# Patient Record
Sex: MALE | Race: WHITE | Employment: OTHER | URBAN - NONMETROPOLITAN AREA
[De-identification: names, ages, dates, MRNs, and addresses within clinical notes are randomized per-mention and may not be internally consistent; named-entity substitution may affect disease eponyms.]

---

## 2018-02-09 ENCOUNTER — DOCUMENTATION ONLY (OUTPATIENT)
Dept: FAMILY MEDICINE | Facility: OTHER | Age: 23
End: 2018-02-09

## 2018-02-09 PROBLEM — L50.9 URTICARIA: Status: ACTIVE | Noted: 2018-02-09

## 2018-02-09 PROBLEM — L70.9 ACNE, MILD: Status: ACTIVE | Noted: 2018-02-09

## 2021-01-05 ENCOUNTER — OFFICE VISIT (OUTPATIENT)
Dept: FAMILY MEDICINE | Facility: OTHER | Age: 26
End: 2021-01-05
Attending: NURSE PRACTITIONER
Payer: COMMERCIAL

## 2021-01-05 VITALS
TEMPERATURE: 98.3 F | DIASTOLIC BLOOD PRESSURE: 74 MMHG | OXYGEN SATURATION: 100 % | BODY MASS INDEX: 24.28 KG/M2 | WEIGHT: 189.2 LBS | RESPIRATION RATE: 16 BRPM | HEIGHT: 74 IN | SYSTOLIC BLOOD PRESSURE: 120 MMHG | HEART RATE: 66 BPM

## 2021-01-05 DIAGNOSIS — R21 RASH: ICD-10-CM

## 2021-01-05 DIAGNOSIS — B02.9 HERPES ZOSTER WITHOUT COMPLICATION: Primary | ICD-10-CM

## 2021-01-05 PROCEDURE — 99202 OFFICE O/P NEW SF 15 MIN: CPT | Performed by: NURSE PRACTITIONER

## 2021-01-05 RX ORDER — VALACYCLOVIR HYDROCHLORIDE 1 G/1
1000 TABLET, FILM COATED ORAL 3 TIMES DAILY
Qty: 21 TABLET | Refills: 0 | Status: SHIPPED | OUTPATIENT
Start: 2021-01-05 | End: 2022-03-28

## 2021-01-05 ASSESSMENT — PAIN SCALES - GENERAL: PAINLEVEL: MODERATE PAIN (5)

## 2021-01-05 ASSESSMENT — MIFFLIN-ST. JEOR: SCORE: 1902.96

## 2021-01-05 NOTE — NURSING NOTE
"Chief Complaint   Patient presents with     Derm Problem     rash     Patient presented with rash on left side body.  Started mid last week to Friday.  Initial /74 (BP Location: Right arm, Patient Position: Sitting, Cuff Size: Adult Regular)   Pulse 66   Temp 98.3  F (36.8  C) (Tympanic)   Resp 16   Ht 1.88 m (6' 2\")   Wt 85.8 kg (189 lb 3.2 oz)   SpO2 100%   BMI 24.29 kg/m   Estimated body mass index is 24.29 kg/m  as calculated from the following:    Height as of this encounter: 1.88 m (6' 2\").    Weight as of this encounter: 85.8 kg (189 lb 3.2 oz).  Medication Reconciliation: complete    PHILL Gatica, [facility]  "

## 2021-01-05 NOTE — PROGRESS NOTES
"HPI:    Jeff Singh is a 25 year old male  who presents to [facility] today for a rash that started about 5 days ago. The patient states that he thinks the rash is spreading. Deines having a rash like this in the past. It is located on the left side and his left side of his back. Reports that it started as a quarter sized red spot on the patient's left side and has now started spreading. He reports that it is itchy and causing a burning pain especially with palpation. Rating his pain 4/10. Patient states that it hurts when he has clothes covering the area or anything touching the rash. Patient denies putting any OTC medications on the area or taking any medications for the pain. Patient denies any new detergent or soap.      History reviewed. No pertinent past medical history.  History reviewed. No pertinent surgical history.  Social History     Tobacco Use     Smoking status: Never Smoker     Smokeless tobacco: Never Used   Substance Use Topics     Alcohol use: No     Alcohol/week: 0.0 standard drinks     No current outpatient medications on file.     No Known Allergies      Past medical history, past surgical history, current medications and allergies reviewed and accurate to the best of my knowledge.        ROS:  Refer to HPI    /74 (BP Location: Right arm, Patient Position: Sitting, Cuff Size: Adult Regular)   Pulse 66   Temp 98.3  F (36.8  C) (Tympanic)   Resp 16   Ht 1.88 m (6' 2\")   Wt 85.8 kg (189 lb 3.2 oz)   SpO2 100%   BMI 24.29 kg/m      EXAM:  General Appearance: Well appearing male, appropriate appearance for age. No acute distress  Respiratory: normal chest wall and respirations.  Normal effort.  Clear to auscultation bilaterally, no wheezing, crackles or rhonchi.  No increased work of breathing.  No cough appreciated.  Cardiac: RRR with no murmurs    Dermatological: 3 areas of a veisular rash to the patient's left side/back on an erythematous base.   Psychological: normal " affect, alert, oriented, and pleasant.             ASSESSMENT/PLAN:  1. Rash    - valACYclovir (VALTREX) 1000 mg tablet; Take 1 tablet (1,000 mg) by mouth 3 times daily for 7 days  Dispense: 21 tablet; Refill: 0    2. Herpes Zoster without complication     Discussed with the patient that based on his symptoms and physical exam this does appear to be herpes zoster. Informed the patient that even with taking the valacyclovir, it will take time for the lesions to heal. He will most likely see the vesicles rupture open and then scab over and he is considered contagious until the area is scabbed over.     Instructed the patient to keep the area clean with soap and water and covered.        May use over-the-counter Tylenol or ibuprofen PRN    Discussed warning signs/symptoms indicative of need to f/u    Follow up if symptoms persist or worsen or concerns      I explained my diagnostic considerations and recommendations to the patient, who voiced understanding and agreement with the treatment plan. All questions were answered. We discussed potential side effects of any prescribed or recommended therapies, as well as expectations for response to treatments.    Disclaimer:  This note consists of words and symbols derived from keyboarding, dictation, or using voice recognition software. As a result, there may be errors in the script that have gone undetected. Please consider this when interpreting information found in this note.

## 2021-08-11 ENCOUNTER — ALLIED HEALTH/NURSE VISIT (OUTPATIENT)
Dept: FAMILY MEDICINE | Facility: OTHER | Age: 26
End: 2021-08-11
Attending: FAMILY MEDICINE
Payer: COMMERCIAL

## 2021-08-11 DIAGNOSIS — Z20.822 COVID-19 RULED OUT: Primary | ICD-10-CM

## 2021-08-11 LAB — SARS-COV-2 RNA RESP QL NAA+PROBE: NEGATIVE

## 2021-08-11 PROCEDURE — C9803 HOPD COVID-19 SPEC COLLECT: HCPCS

## 2021-08-11 PROCEDURE — U0003 INFECTIOUS AGENT DETECTION BY NUCLEIC ACID (DNA OR RNA); SEVERE ACUTE RESPIRATORY SYNDROME CORONAVIRUS 2 (SARS-COV-2) (CORONAVIRUS DISEASE [COVID-19]), AMPLIFIED PROBE TECHNIQUE, MAKING USE OF HIGH THROUGHPUT TECHNOLOGIES AS DESCRIBED BY CMS-2020-01-R: HCPCS | Mod: ZL

## 2021-08-14 ENCOUNTER — HEALTH MAINTENANCE LETTER (OUTPATIENT)
Age: 26
End: 2021-08-14

## 2021-10-09 ENCOUNTER — HEALTH MAINTENANCE LETTER (OUTPATIENT)
Age: 26
End: 2021-10-09

## 2022-03-25 PROBLEM — R44.2: Status: ACTIVE | Noted: 2021-02-16

## 2022-03-25 PROBLEM — R25.3 FASCICULATION: Status: ACTIVE | Noted: 2020-08-10

## 2022-03-25 PROBLEM — J34.89 NASAL DISCHARGE: Status: ACTIVE | Noted: 2021-02-16

## 2022-03-25 PROBLEM — R00.2 PALPITATIONS: Status: ACTIVE | Noted: 2020-08-10

## 2022-03-25 PROBLEM — L03.90 CELLULITIS: Status: ACTIVE | Noted: 2021-06-13

## 2022-03-25 PROBLEM — I47.10 SUPRAVENTRICULAR TACHYCARDIA (H): Status: ACTIVE | Noted: 2020-08-13

## 2022-03-25 PROBLEM — T40.8X1A: Status: ACTIVE | Noted: 2021-02-16

## 2022-03-25 PROBLEM — R07.9 CHEST PAIN: Status: ACTIVE | Noted: 2020-08-10

## 2022-03-25 NOTE — PROGRESS NOTES
Assessment & Plan     1. Impacted cerumen of right ear  Successful right ear flush performed in clinic by Uma Elliott RN.  Repeat exam showed normal TM.  Encouraged avoidance of use of Q-tips.  Follow-up as needed.      Return if symptoms worsen or fail to improve.    Sosa Dougherty PA-C  [facility] AND [facility]    Subjective   Jeff is a 26 year old who presents for the following health issues cleaning of his ears.     History of Present Illness       Reason for visit:  Earwax  Symptom onset:  1-3 days ago  Symptoms include:  Can t hear  Symptom intensity:  Moderate  Symptom progression:  Staying the same  Had these symptoms before:  Yes  Has tried/received treatment for these symptoms:  Yes  Previous treatment was successful:  Yes  Prior treatment description:  Ear cleaning  What makes it worse:  No  What makes it better:  Ear cleaning    He eats 2-3 servings of fruits and vegetables daily.He consumes 0 sweetened beverage(s) daily.He exercises with enough effort to increase his heart rate 30 to 60 minutes per day.  He exercises with enough effort to increase his heart rate 5 days per week.   He is taking medications regularly.     Here for evaluation of ear concerns.  Patient reports at the end of last week he was using a chainsaw also had earplugs in.  Reports he remove the ear plug and noticed quite a bit of wax in his right ear and had difficulties hearing after that time.  He does report that he has had to have his ears flushed a few x10 previous clinics.  Does report that he has a history of significant wax production.  Has not tried anything for symptomatic management home.  No fever/chills, ear pain or drainage, recent cough or cold symptoms.    PAST MEDICAL HISTORY: History reviewed. No pertinent past medical history.    PAST SURGICAL HISTORY: History reviewed. No pertinent surgical history.    FAMILY HISTORY:   Family History   Problem Relation Age of Onset     Other - See Comments  Father         has SA node dysfunction began in 60s,       SOCIAL HISTORY:   Social History     Tobacco Use     Smoking status: Never Smoker     Smokeless tobacco: Never Used   Substance Use Topics     Alcohol use: No      No Known Allergies  Current Outpatient Medications   Medication     clindamycin (CLEOCIN T) 1 % external solution     diclofenac (VOLTAREN) 1 % topical gel     cephALEXin (KEFLEX) 500 MG capsule     clindamycin (CLEOCIN T) 1 % external solution     clindamycin (CLEOCIN) 300 MG capsule     COVID-19 Specimen Collection KIT     doxycycline monohydrate (ADOXA) 100 MG tablet     valACYclovir (VALTREX) 1000 mg tablet     No current facility-administered medications for this visit.         Review of Systems   Per HPI        Objective    /68 (BP Location: Left arm, Patient Position: Sitting, Cuff Size: Adult Regular)   Pulse 70   Temp 97.4  F (36.3  C) (Temporal)   Resp 16   Wt 83.9 kg (185 lb)   SpO2 98%   BMI 23.75 kg/m    Body mass index is 23.75 kg/m .  Physical Exam   General: Pleasant, in no apparent distress.  Eyes: Sclera are white and conjunctiva are clear bilaterally. Lacrimal apparatus free of erythema, edema, and discharge bilaterally.  Ears: External ears without erythema or edema.  External auditory canal is free of foreign bodies, erythema, ulcers, and masses on left and completely occluded by impacted cerumen on right.  Psych: Appropriate mood and affect.

## 2022-03-28 ENCOUNTER — OFFICE VISIT (OUTPATIENT)
Dept: FAMILY MEDICINE | Facility: OTHER | Age: 27
End: 2022-03-28
Attending: PHYSICIAN ASSISTANT
Payer: COMMERCIAL

## 2022-03-28 VITALS
BODY MASS INDEX: 23.75 KG/M2 | RESPIRATION RATE: 16 BRPM | OXYGEN SATURATION: 98 % | HEART RATE: 70 BPM | WEIGHT: 185 LBS | SYSTOLIC BLOOD PRESSURE: 112 MMHG | DIASTOLIC BLOOD PRESSURE: 68 MMHG | TEMPERATURE: 97.4 F

## 2022-03-28 DIAGNOSIS — H61.21 IMPACTED CERUMEN OF RIGHT EAR: Primary | ICD-10-CM

## 2022-03-28 PROCEDURE — 99212 OFFICE O/P EST SF 10 MIN: CPT | Performed by: PHYSICIAN ASSISTANT

## 2022-03-28 PROCEDURE — 69209 REMOVE IMPACTED EAR WAX UNI: CPT | Performed by: PHYSICIAN ASSISTANT

## 2022-03-28 RX ORDER — CLINDAMYCIN PHOSPHATE 11.9 MG/ML
SOLUTION TOPICAL
COMMUNITY
Start: 2021-05-31 | End: 2022-03-28

## 2022-03-28 RX ORDER — DOXYCYCLINE 100 MG/1
TABLET ORAL
COMMUNITY
Start: 2021-06-15 | End: 2022-03-28

## 2022-03-28 RX ORDER — CLINDAMYCIN HCL 300 MG
CAPSULE ORAL
COMMUNITY
Start: 2021-06-12 | End: 2022-03-28

## 2022-03-28 RX ORDER — COVID-19 TEST SPECIMEN COLLECT
MISCELLANEOUS MISCELLANEOUS
COMMUNITY
Start: 2021-10-05 | End: 2022-03-28

## 2022-03-28 RX ORDER — CLINDAMYCIN PHOSPHATE 11.9 MG/ML
1 SOLUTION TOPICAL
COMMUNITY
Start: 2021-03-17 | End: 2022-03-28

## 2022-03-28 RX ORDER — CEPHALEXIN 500 MG/1
CAPSULE ORAL
COMMUNITY
Start: 2021-06-15 | End: 2022-03-28

## 2022-03-28 ASSESSMENT — PAIN SCALES - GENERAL: PAINLEVEL: NO PAIN (0)

## 2022-03-28 NOTE — NURSING NOTE
"Chief Complaint   Patient presents with     Ear Cleaning     Patient presents to clinic to have his ears washed out.     Initial /68 (BP Location: Left arm, Patient Position: Sitting, Cuff Size: Adult Regular)   Pulse 70   Temp 97.4  F (36.3  C) (Temporal)   Resp 16   Wt 83.9 kg (185 lb)   SpO2 98%   BMI 23.75 kg/m   Estimated body mass index is 23.75 kg/m  as calculated from the following:    Height as of 1/5/21: 1.88 m (6' 2\").    Weight as of this encounter: 83.9 kg (185 lb).  Medication Reconciliation: complete  Uma Elliott RN  FOOD SECURITY SCREENING QUESTIONS  The next two questions are to help us understand your food security.  If you are feeling you need any assistance in this area, we have resources available to support you today.    Hunger Vital Signs:  Within the past 12 months we worried whether our food would run out before we got money to buy more. Never  Within the past 12 months the food we bought just didn't last and we didn't have money to get more. Never      Uma Elliott RN 3/28/2022 12:47 PM  "

## 2022-09-17 ENCOUNTER — HEALTH MAINTENANCE LETTER (OUTPATIENT)
Age: 27
End: 2022-09-17

## 2023-10-07 ENCOUNTER — HEALTH MAINTENANCE LETTER (OUTPATIENT)
Age: 28
End: 2023-10-07

## 2024-11-30 ENCOUNTER — HEALTH MAINTENANCE LETTER (OUTPATIENT)
Age: 29
End: 2024-11-30